# Patient Record
Sex: FEMALE | Race: WHITE | URBAN - METROPOLITAN AREA
[De-identification: names, ages, dates, MRNs, and addresses within clinical notes are randomized per-mention and may not be internally consistent; named-entity substitution may affect disease eponyms.]

---

## 2020-01-30 ENCOUNTER — TELEPHONE (OUTPATIENT)
Dept: GASTROENTEROLOGY | Facility: CLINIC | Age: 70
End: 2020-01-30

## 2020-01-30 NOTE — TELEPHONE ENCOUNTER
Tried listed number, [facility]  said they did not have a resident by that name living there  No alternate number listed in [facility] system

## 2020-02-25 NOTE — TELEPHONE ENCOUNTER
Dr Andrew Rose cannot reach pt for recall-we have no current address or phone number-[facility] has not seen since 2015   Please advise-thank you